# Patient Record
(demographics unavailable — no encounter records)

---

## 2024-12-17 NOTE — HISTORY OF PRESENT ILLNESS
[FreeTextEntry1] : Patient of AS  The patient is an 83 year old male presenting today for a follow up for Ca prostate The patient has a past surgical history of a radical prostatectomy in 1998.  He reports that he has nocturia 2 times per night, but attributes this to the large volumes of water he drinks. He denies any incontinence or leakage.  no dysuria or hematuria.   9/22 - nothing new   5/23 - doping well no urinary issues, though notes nocturia increased from 2 to 3    11/23 Since last visit the states his nocturia is worsening from 2x to 3x nightly. No dysuria or hematuria. Sometimes has urgency, but has not wet himself. PSA Hx: 0.14 on 5/30/23 0.12 on 9/9/22 0.10 on 3/11/22  5/24 - nothing new.- voiding as before; no incontinence  PSA 0.15   12/25 - no issues with voiding - nocturia 2 times and can have urbecy